# Patient Record
Sex: MALE | Employment: FULL TIME | URBAN - METROPOLITAN AREA
[De-identification: names, ages, dates, MRNs, and addresses within clinical notes are randomized per-mention and may not be internally consistent; named-entity substitution may affect disease eponyms.]

---

## 2022-12-08 ENCOUNTER — OFFICE VISIT (OUTPATIENT)
Dept: FAMILY MEDICINE CLINIC | Age: 62
End: 2022-12-08
Payer: COMMERCIAL

## 2022-12-08 VITALS
DIASTOLIC BLOOD PRESSURE: 78 MMHG | HEIGHT: 71 IN | HEART RATE: 102 BPM | SYSTOLIC BLOOD PRESSURE: 158 MMHG | BODY MASS INDEX: 39.83 KG/M2 | WEIGHT: 284.5 LBS | OXYGEN SATURATION: 94 % | RESPIRATION RATE: 16 BRPM

## 2022-12-08 DIAGNOSIS — E66.01 CLASS 3 SEVERE OBESITY DUE TO EXCESS CALORIES WITHOUT SERIOUS COMORBIDITY WITH BODY MASS INDEX (BMI) OF 40.0 TO 44.9 IN ADULT (HCC): ICD-10-CM

## 2022-12-08 DIAGNOSIS — R03.0 BORDERLINE HYPERTENSION: ICD-10-CM

## 2022-12-08 DIAGNOSIS — Z80.42 FAMILY HX OF PROSTATE CANCER: ICD-10-CM

## 2022-12-08 DIAGNOSIS — E78.5 HYPERLIPIDEMIA, UNSPECIFIED HYPERLIPIDEMIA TYPE: ICD-10-CM

## 2022-12-08 DIAGNOSIS — N50.89 SCROTAL MASS: Primary | ICD-10-CM

## 2022-12-08 LAB
A/G RATIO: 1.8 (ref 1.1–2.2)
ALBUMIN SERPL-MCNC: 4.2 G/DL (ref 3.4–5)
ALP BLD-CCNC: 82 U/L (ref 40–129)
ALT SERPL-CCNC: 23 U/L (ref 10–40)
ANION GAP SERPL CALCULATED.3IONS-SCNC: 14 MMOL/L (ref 3–16)
AST SERPL-CCNC: 19 U/L (ref 15–37)
BILIRUB SERPL-MCNC: 0.5 MG/DL (ref 0–1)
BUN BLDV-MCNC: 18 MG/DL (ref 7–20)
CALCIUM SERPL-MCNC: 9.2 MG/DL (ref 8.3–10.6)
CHLORIDE BLD-SCNC: 101 MMOL/L (ref 99–110)
CHOLESTEROL, TOTAL: 201 MG/DL (ref 0–199)
CO2: 24 MMOL/L (ref 21–32)
CREAT SERPL-MCNC: 0.8 MG/DL (ref 0.8–1.3)
GFR SERPL CREATININE-BSD FRML MDRD: >60 ML/MIN/{1.73_M2}
GLUCOSE BLD-MCNC: 100 MG/DL (ref 70–99)
HDLC SERPL-MCNC: 32 MG/DL (ref 40–60)
LDL CHOLESTEROL CALCULATED: 111 MG/DL
POTASSIUM SERPL-SCNC: 4.4 MMOL/L (ref 3.5–5.1)
PROSTATE SPECIFIC ANTIGEN: 1.61 NG/ML (ref 0–4)
SODIUM BLD-SCNC: 139 MMOL/L (ref 136–145)
TOTAL PROTEIN: 6.6 G/DL (ref 6.4–8.2)
TRIGL SERPL-MCNC: 292 MG/DL (ref 0–150)
VLDLC SERPL CALC-MCNC: 58 MG/DL

## 2022-12-08 PROCEDURE — 99204 OFFICE O/P NEW MOD 45 MIN: CPT | Performed by: FAMILY MEDICINE

## 2022-12-08 SDOH — ECONOMIC STABILITY: FOOD INSECURITY: WITHIN THE PAST 12 MONTHS, THE FOOD YOU BOUGHT JUST DIDN'T LAST AND YOU DIDN'T HAVE MONEY TO GET MORE.: NEVER TRUE

## 2022-12-08 SDOH — ECONOMIC STABILITY: FOOD INSECURITY: WITHIN THE PAST 12 MONTHS, YOU WORRIED THAT YOUR FOOD WOULD RUN OUT BEFORE YOU GOT MONEY TO BUY MORE.: NEVER TRUE

## 2022-12-08 ASSESSMENT — ENCOUNTER SYMPTOMS
EYE PAIN: 0
DIARRHEA: 0
SHORTNESS OF BREATH: 0
NAUSEA: 0
CHEST TIGHTNESS: 0
TROUBLE SWALLOWING: 0
WHEEZING: 0
ABDOMINAL PAIN: 0
VOMITING: 0
BLOOD IN STOOL: 0

## 2022-12-08 ASSESSMENT — PATIENT HEALTH QUESTIONNAIRE - PHQ9
SUM OF ALL RESPONSES TO PHQ9 QUESTIONS 1 & 2: 0
SUM OF ALL RESPONSES TO PHQ QUESTIONS 1-9: 0
2. FEELING DOWN, DEPRESSED OR HOPELESS: 0
SUM OF ALL RESPONSES TO PHQ QUESTIONS 1-9: 0
SUM OF ALL RESPONSES TO PHQ QUESTIONS 1-9: 0
1. LITTLE INTEREST OR PLEASURE IN DOING THINGS: 0
SUM OF ALL RESPONSES TO PHQ QUESTIONS 1-9: 0

## 2022-12-08 ASSESSMENT — SOCIAL DETERMINANTS OF HEALTH (SDOH): HOW HARD IS IT FOR YOU TO PAY FOR THE VERY BASICS LIKE FOOD, HOUSING, MEDICAL CARE, AND HEATING?: NOT HARD AT ALL

## 2022-12-08 NOTE — PROGRESS NOTES
12/8/2022    Rosenda Frienddmont Abshear    Chief Complaint   Patient presents with    Mass     Lump in scrotum. Left side. Noticed a year ago. Getting bigger. Baseball size moving down. Urinary Frequency     Little leaking and urgency to go. He believes it is from the mass. HPI  History was obtained from the patient. [facility] is a 63 y.o. male who presents today with history of noting scrotal nodule and then enlargement over the last 1 year. Not really painful to have a little bit of urinary difficulty at times depending on position. No history of trauma. Does have a history of noting a little bit of sensation as something giving way when he was lifting a heavy object little over a year ago. Does have intermittent trouble with constipation has increased his fiber which has helped. Patient has a past history of obesity, minimal coronary disease on previous studies, hyperlipidemia, and GE reflux. There is a family history of CA prostate and his father as a elderly man. Patient has had COVID disease in the past.  Does not really have any history of significant recent immunizations not sure he wants them. Patient takes only Nexium for GERD and occasional vitamin. Author Blanca REVIEW OF SYMPTOMS    Review of Systems   Constitutional:  Negative for activity change and fatigue. HENT:  Negative for congestion, hearing loss, mouth sores and trouble swallowing. Eyes:  Negative for pain and visual disturbance. Respiratory:  Negative for chest tightness, shortness of breath and wheezing. Cardiovascular:  Negative for chest pain and palpitations. Gastrointestinal:  Negative for abdominal pain, blood in stool, diarrhea, nausea and vomiting. Genitourinary:  Negative for dysuria, frequency and urgency. Musculoskeletal:  Negative for arthralgias, gait problem and neck stiffness. Skin:  Negative for rash. Allergic/Immunologic: Negative for environmental allergies.    Neurological:  Negative for dizziness, seizures, speech difficulty and weakness. Hematological:  Does not bruise/bleed easily. Psychiatric/Behavioral:  Negative for agitation, confusion, hallucinations, self-injury and suicidal ideas. The patient is not nervous/anxious. PAST MEDICAL HISTORY  Past Medical History:   Diagnosis Date    Abnormal EKG     Abnormal stress test     Chest pain     GERD (gastroesophageal reflux disease)     H/O cardiovascular stress test 2012-Abnormal study. Evidence of mild ischemia in RCA region. EF 70%. Global LV systolic function normal. Exercise capacity normal. No ECG changes. Exercise capacity 7.0 METS. H/O echocardiogram 2012    -PX systollic function normal. E => 55%. Doppler flow pattern suggestive of impaired LV relaxation. Left atrium is mildly dilated. History of complete ECG 10/8/2012    10/8/2012-Dr Etienne OV       FAMILY HISTORY  Family History   Problem Relation Age of Onset    Coronary Art Dis Mother     COPD Mother     Arrhythmia Mother         Atrial fib    Cancer Father      Labor Father     Kidney Disease Brother     Heart Attack Maternal Grandfather        SOCIAL HISTORY  Social History     Socioeconomic History    Marital status:    Tobacco Use    Smoking status: Never    Smokeless tobacco: Never   Vaping Use    Vaping Use: Never used   Substance and Sexual Activity    Alcohol use: No     Comment:        CAFFEINE: 2 luca daily    Drug use: Never    Sexual activity: Not Currently     Partners: Female     Social Determinants of Health     Financial Resource Strain: Low Risk     Difficulty of Paying Living Expenses: Not hard at all   Food Insecurity: No Food Insecurity    Worried About Running Out of Food in the Last Year: Never true    Ran Out of Food in the Last Year: Never true        SURGICAL HISTORY  No past surgical history on file.               CURRENT MEDICATIONS  Current Outpatient Medications   Medication Sig Dispense Refill    NEXIUM 40 MG capsule TAKE ONE CAPSULE BY MOUTH EVERY DAY AFTER DINNER 90 capsule 3    multivitamin (THERAGRAN) per tablet Take 1 tablet by mouth daily. Vitamin D (CHOLECALCIFEROL) 1000 UNITS CAPS capsule Take 1,000 Units by mouth daily. (Patient not taking: Reported on 12/8/2022)      SAW PALMETTO Take  by mouth daily. Verify strength  (Patient not taking: Reported on 12/8/2022)       No current facility-administered medications for this visit. ALLERGIES  No Known Allergies    PHYSICAL EXAM    BP (!) 158/78 (Site: Right Upper Arm, Position: Sitting, Cuff Size: Medium Adult)   Pulse (!) 102   Resp 16   Ht 5' 10.5\" (1.791 m)   Wt 284 lb 8 oz (129 kg)   SpO2 94%   BMI 40.24 kg/m²     Physical Exam  Vitals and nursing note reviewed. Constitutional:       General: He is not in acute distress. Appearance: He is well-developed. He is obese. He is not ill-appearing, toxic-appearing or diaphoretic. HENT:      Head: Normocephalic and atraumatic. Nose: No congestion. Mouth/Throat:      Mouth: Mucous membranes are moist.      Pharynx: Oropharynx is clear. Eyes:      Pupils: Pupils are equal, round, and reactive to light. Cardiovascular:      Rate and Rhythm: Normal rate and regular rhythm. Heart sounds: Normal heart sounds. No murmur heard. No gallop. Pulmonary:      Effort: Pulmonary effort is normal. No respiratory distress. Breath sounds: Normal breath sounds. No wheezing, rhonchi or rales. Abdominal:      General: There is no distension. Palpations: Abdomen is soft. Genitourinary:     Comments: Patient with massively enlarged scrotum with irregular mass inside probably bowel. Penis is retracted due to tension. No definite skin breakdown noted. No evidence of infection. Musculoskeletal:         General: No deformity. Normal range of motion. Cervical back: Normal range of motion and neck supple. No rigidity. Skin:     General: Skin is warm and dry. Coloration: Skin is not jaundiced. Findings: No bruising. Neurological:      General: No focal deficit present. Mental Status: He is alert and oriented to person, place, and time. Mental status is at baseline. Cranial Nerves: No cranial nerve deficit. Motor: No weakness. Psychiatric:         Mood and Affect: Mood normal.         Thought Content: Thought content normal.       ASSESSMENT & PLAN     Diagnosis Orders   1. Scrotal mass        2. Family hx of prostate cancer  PSA Screening      3. Hyperlipidemia, unspecified hyperlipidemia type  LIPID PANEL      4. Class 3 severe obesity due to excess calories without serious comorbidity with body mass index (BMI) of 40.0 to 44.9 in adult ([facility] [address]        5. Borderline hypertension  Comprehensive Metabolic Panel    CBC      Patient to check home blood pressures work on low-salt diet and weight loss. Check PSA, CMP, CBC and lipid panel. Patient encouraged to consider flu shot and COVID booster. So far does not have much in way of an immunizations. We will set up ultrasound of the scrotum to be sure that his bowel and scrotum then will refer for surgical repair as indicated. Patient will follow up for testing results. Return if symptoms worsen or fail to improve.          Electronically signed by Debbie Fontana MD on 12/8/2022

## 2022-12-15 ENCOUNTER — HOSPITAL ENCOUNTER (OUTPATIENT)
Dept: ULTRASOUND IMAGING | Age: 62
Discharge: HOME | End: 2022-12-15
Payer: COMMERCIAL

## 2022-12-15 DIAGNOSIS — N50.89 SCROTAL MASS: Primary | ICD-10-CM

## 2022-12-15 DIAGNOSIS — Z80.42 FAMILY HX OF PROSTATE CANCER: ICD-10-CM

## 2022-12-15 DIAGNOSIS — N50.89 SCROTAL MASS: ICD-10-CM

## 2022-12-15 PROCEDURE — 93975 VASCULAR STUDY: CPT

## 2022-12-15 PROCEDURE — 76870 US EXAM SCROTUM: CPT

## 2022-12-21 ENCOUNTER — HOSPITAL ENCOUNTER (OUTPATIENT)
Dept: CT IMAGING | Age: 62
Discharge: HOME | End: 2022-12-21
Payer: COMMERCIAL

## 2022-12-21 DIAGNOSIS — N50.89 SCROTAL MASS: ICD-10-CM

## 2022-12-21 PROCEDURE — 74177 CT ABD & PELVIS W/CONTRAST: CPT

## 2022-12-21 PROCEDURE — A4641 RADIOPHARM DX AGENT NOC: HCPCS | Performed by: FAMILY MEDICINE

## 2022-12-21 PROCEDURE — 6360000004 HC RX CONTRAST MEDICATION: Performed by: FAMILY MEDICINE

## 2022-12-21 RX ORDER — SODIUM CHLORIDE 0.9 % (FLUSH) 0.9 %
5-40 SYRINGE (ML) INJECTION PRN
Status: DISCONTINUED | OUTPATIENT
Start: 2022-12-21 | End: 2022-12-22 | Stop reason: HOSPADM

## 2022-12-21 RX ADMIN — IOPAMIDOL 75 ML: 755 INJECTION, SOLUTION INTRAVENOUS at 14:15

## 2022-12-21 RX ADMIN — BARIUM SULFATE 900 ML: 20 SUSPENSION ORAL at 13:20

## 2022-12-23 ENCOUNTER — TELEPHONE (OUTPATIENT)
Dept: FAMILY MEDICINE CLINIC | Age: 62
End: 2022-12-23

## 2022-12-23 NOTE — TELEPHONE ENCOUNTER
Patient had CT of abdomen pelvis with contrast yesterday. It did not show any suspicious areas of malignancy. Has some scrotal wall thickening, fatty liver, umbilical hernia containing fatty tissue, right inguinal hernia with fatty tissue, mild calcification of the posterior left bladder wall, and heterogeneous masses on both sides of the scrotal sac -left larger than right several centimeters on either side. Had 1 enlarged inguinal node felt to be probably reactive. Masses do not appear to be malignancy. Differential included hemorrhage, hematoma, testicular rupture etc.  Urologic consultation recommended. I discussed this with patient today on phone. Recommended that he call urologist (Dr. Justin Salazar) in the next few days to get appointment and consider treatment /surgery as they direct.

## 2023-01-20 ENCOUNTER — HOSPITAL ENCOUNTER (OUTPATIENT)
Dept: CT IMAGING | Age: 63
Discharge: HOME | End: 2023-01-20
Payer: COMMERCIAL

## 2023-01-20 ENCOUNTER — TELEPHONE (OUTPATIENT)
Dept: FAMILY MEDICINE CLINIC | Age: 63
End: 2023-01-20

## 2023-01-20 ENCOUNTER — TRANSCRIBE ORDERS (OUTPATIENT)
Dept: ADMINISTRATIVE | Age: 63
End: 2023-01-20

## 2023-01-20 ENCOUNTER — HOSPITAL ENCOUNTER (OUTPATIENT)
Age: 63
Discharge: HOME | End: 2023-01-20
Payer: COMMERCIAL

## 2023-01-20 DIAGNOSIS — D40.12 NEOPLASM OF UNCERTAIN BEHAVIOR OF LEFT TESTIS: ICD-10-CM

## 2023-01-20 DIAGNOSIS — D40.12 NEOPLASM OF UNCERTAIN BEHAVIOR OF LEFT TESTIS: Primary | ICD-10-CM

## 2023-01-20 LAB
ALBUMIN SERPL-MCNC: 4.3 GM/DL (ref 3.4–5)
ALP BLD-CCNC: 82 IU/L (ref 40–128)
ALT SERPL-CCNC: 22 U/L (ref 10–40)
ANION GAP SERPL CALCULATED.3IONS-SCNC: 12 MMOL/L (ref 4–16)
AST SERPL-CCNC: 17 IU/L (ref 15–37)
BASOPHILS ABSOLUTE: 0.1 K/CU MM
BASOPHILS RELATIVE PERCENT: 0.8 % (ref 0–1)
BILIRUB SERPL-MCNC: 0.6 MG/DL (ref 0–1)
BUN BLDV-MCNC: 17 MG/DL (ref 6–23)
CALCIUM SERPL-MCNC: 9.3 MG/DL (ref 8.3–10.6)
CHLORIDE BLD-SCNC: 101 MMOL/L (ref 99–110)
CO2: 23 MMOL/L (ref 21–32)
CREAT SERPL-MCNC: 0.9 MG/DL (ref 0.9–1.3)
DIFFERENTIAL TYPE: ABNORMAL
EOSINOPHILS ABSOLUTE: 0.2 K/CU MM
EOSINOPHILS RELATIVE PERCENT: 2.4 % (ref 0–3)
GFR SERPL CREATININE-BSD FRML MDRD: >60 ML/MIN/1.73M2
GLUCOSE BLD-MCNC: 170 MG/DL (ref 70–99)
HCG QUALITATIVE: NEGATIVE
HCT VFR BLD CALC: 47.5 % (ref 42–52)
HEMOGLOBIN: 15.1 GM/DL (ref 13.5–18)
IMMATURE NEUTROPHIL %: 0.6 % (ref 0–0.43)
LACTATE DEHYDROGENASE: 187 IU/L (ref 120–246)
LYMPHOCYTES ABSOLUTE: 1.6 K/CU MM
LYMPHOCYTES RELATIVE PERCENT: 18.2 % (ref 24–44)
MCH RBC QN AUTO: 30.9 PG (ref 27–31)
MCHC RBC AUTO-ENTMCNC: 31.8 % (ref 32–36)
MCV RBC AUTO: 97.3 FL (ref 78–100)
MONOCYTES ABSOLUTE: 0.8 K/CU MM
MONOCYTES RELATIVE PERCENT: 8.9 % (ref 0–4)
NUCLEATED RBC %: 0 %
PDW BLD-RTO: 12.7 % (ref 11.7–14.9)
PLATELET # BLD: 213 K/CU MM (ref 140–440)
PMV BLD AUTO: 12.4 FL (ref 7.5–11.1)
POTASSIUM SERPL-SCNC: 4.2 MMOL/L (ref 3.5–5.1)
RBC # BLD: 4.88 M/CU MM (ref 4.6–6.2)
SEGMENTED NEUTROPHILS ABSOLUTE COUNT: 6 K/CU MM
SEGMENTED NEUTROPHILS RELATIVE PERCENT: 69.1 % (ref 36–66)
SODIUM BLD-SCNC: 136 MMOL/L (ref 135–145)
TOTAL IMMATURE NEUTOROPHIL: 0.05 K/CU MM
TOTAL NUCLEATED RBC: 0 K/CU MM
TOTAL PROTEIN: 6.4 GM/DL (ref 6.4–8.2)
WBC # BLD: 8.6 K/CU MM (ref 4–10.5)

## 2023-01-20 PROCEDURE — 84703 CHORIONIC GONADOTROPIN ASSAY: CPT

## 2023-01-20 PROCEDURE — 83615 LACTATE (LD) (LDH) ENZYME: CPT

## 2023-01-20 PROCEDURE — 36415 COLL VENOUS BLD VENIPUNCTURE: CPT

## 2023-01-20 PROCEDURE — 82105 ALPHA-FETOPROTEIN SERUM: CPT

## 2023-01-20 PROCEDURE — 80053 COMPREHEN METABOLIC PANEL: CPT

## 2023-01-20 PROCEDURE — 71250 CT THORAX DX C-: CPT

## 2023-01-20 PROCEDURE — 85025 COMPLETE CBC W/AUTO DIFF WBC: CPT

## 2023-01-20 NOTE — TELEPHONE ENCOUNTER
Called to Let Patient know that his Forms he had dropped off were faxed to  's office, for them to complete, since he is the one doing the Surgery to take him off of work for Short Term Disability. Called to let Patient know the Fax was Successful. And I advised him to call their office to let them know.

## 2023-03-07 NOTE — PROGRESS NOTES
Patient Name:  Lenka Evans  Patient :  1960  Patient MRN:  [id number]     Primary Oncologist: Yelitza Thompson MD  Referring Provider: Saundra Valadez MD     Date of Service: 3/9/2023      Reason for Consult:  liposarcoma     Chief Complaint:    Chief Complaint   Patient presents with    New Patient      Patient Active Problem List:     Abnormal EKG     Abnormal stress test     Chest pain     Hyperlipidemia     ASHD (arteriosclerotic heart disease)     Family hx of prostate cancer     Class 3 severe obesity due to excess calories without serious comorbidity with body mass index (BMI) of 40.0 to 44.9 in adult      HPI:   Bony Lemos is a pleasant 65 yo male patient who was referred for  dedifferentiated liposarcoma s/p lef orchiectomy on 2023. He initially noted growing lump to left scrotum. 12/15/2022 US scrotum  Large hypoechoic 16 cm mass filling the scrotum. The testes could not be localized. Scrotal neoplasm is the primary consideration. No discrete bowel peristalsis to suggest a hernia. Urology referral and CT of the abdomen and pelvis with contrast should be considered. 2022 CT AP wo contrast:  1. There are large complex heterogeneous masses noted along both sides of the scrotal sac with the mass on the left measuring 17 x 17 x 11 cm and the mass on the right measuring approximately 7 x 6 x 5 cm. Differential diagnosis would include large hemorrhage/hematoma, testicular rupture, and less likely malignancy. There is only 1 enlarged left inguinal lymph node which is likely reactive. Urologic consultation is recommended. 2. Diffuse scrotal wall thickening which could be related to edema/infection. 3. Right inguinal hernia containing fat. 4. Calcification along the posterior left bladder wall which may represent an intraluminal bladder stone measuring 3 mm. 5. Fatty liver. 6. Small hiatal hernia.   7. Umbilical hernia containing fat.    2023 CT chest:  No acute process within the chest.   Multiple noncalcified pulmonary nodules, measuring up to approximately 5 mm, detailed above. Attention on follow-up is recommended. 2022 PSA 1.61.  2023 CMP and CBC grossly wnl with glucose 170. Hcg negative. AFP 2.     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 Clinical and pathological correlation is recommended. I ordered PET scan and referred to the sarcoma clinic at the [facility]. Due to increasing scrotal swelling likely seroma, I recommend to follow up with Dr Paul Andrea. He has one child. Father  of prostate cancer in his [de-identified]  At age 50 he has colonoscopy by Dr Kriste Skiff with complication. Past Medical History:   Diagnosis Date    Abnormal EKG     Abnormal stress test     Anxiety     Chest pain     GERD (gastroesophageal reflux disease)     H/O cardiovascular stress test 2012-Abnormal study. Evidence of mild ischemia in RCA region. EF 70%. Global LV systolic function normal. Exercise capacity normal. No ECG changes. Exercise capacity 7.0 METS. H/O echocardiogram 2012    -YT systollic function normal. E => 55%. Doppler flow pattern suggestive of impaired LV relaxation. Left atrium is mildly dilated.     History of complete ECG 10/08/2012    10/8/2012-Dr Etienne OV      Past Surgery History:    Left orchiectomy     Social History     Tobacco Use    Smoking status: Never    Smokeless tobacco: Never   Vaping Use    Vaping Use: Never used   Substance Use Topics    Alcohol use: No     Comment:        CAFFEINE: 2 luca daily    Drug use: Never     Family History   Problem Relation Age of Onset    Coronary Art Dis Mother     COPD Mother     Arrhythmia Mother         Atrial fib    Cancer Father      Labor Father     Kidney Disease Brother     Heart Attack Maternal Grandfather      No Known Allergies    Current Outpatient Medications on File Prior to Visit   Medication Sig Dispense Refill    NEXIUM 40 MG capsule TAKE ONE CAPSULE BY MOUTH EVERY DAY AFTER DINNER 90 capsule 3     No current facility-administered medications on file prior to visit. Review of Systems:    Constitutional:  No weight loss, No fever, No chills, No night sweats. Energy level good. Eyes:  No diplopia, No transient or permanent loss of vision, No scotomata. ENT / Mouth:  No epistaxis, No dysphagia, No hoarseness, No oral ulcers, No gingival bleeding. No sore throat, No postnasal drip, No nasal drip, No mouth pain, No sinus pain, No tinnitus, Normal hearing. Cardiovascular:  No chest pain, No palpitations, No syncope, No upper extremity edema, No lower extremity edema, No calf discomfort. Respiratory:  No cough. No hemoptysis, No pleurisy, No wheezing, No dyspnea. Gastrointestinal:  No abdominal pain, No abdominal cramping, No nausea, No vomiting, No constipation, No diarrhea, No hematochezia, No melena, No jaundice, No dyspepsia, No dysphagia. Urinary:  No dysuria, No hematuria, No urinary incontinence. Musculoskeletal:  No muscle pain, No swollen joints, No joint redness, No bone pain, No spine tenderness. Skin:  No rash, No nodules, No pruritus, No lesions. Neurologic:  No confusion, No seizures, No syncope, No tremor, No speech change, No headache, No hiccups, No abnormal gait, No sensory changes, No weakness. Psychiatric:  No depression, No anxiety, Concentration normal.  Endocrine:  No polyuria, No polydipsia, No hot flashes, No thyroid symptoms. Hematologic:  No epistaxis, No gingival bleeding, No petechiae, No ecchymosis. Lymphatic:  No lymphadenopathy, No lymphedema. Allergy / Immunologic:  No eczema, No frequent mucous infections, No frequent respiratory infections, No recurrent urticarial, No frequent skin infections.      Vital Signs: BP (!) 154/81 (Site: Right Upper Arm, Position: Sitting, Cuff Size: Medium Adult)   Pulse (!) 113   Temp 97.8 °F (36.6 °C) (Infrared)   Ht 5' 10\" (1.778 m)   Wt 278 lb (126.1 kg)   SpO2 96%   BMI 39.89 kg/m²      Physical Exam:  CONSTITUTIONAL: awake, alert, cooperative, no apparent distress   EYES: pupils equal, round and reactive to light, sclera clear and conjunctiva normal  ENT: Normocephalic, without obvious abnormality, atraumatic  NECK: supple, symmetrical, no jugular venous distension and no carotid bruits   HEMATOLOGIC/LYMPHATIC: no cervical, supraclavicular or axillary lymphadenopathy   LUNGS: no increased work of breathing and clear to auscultation   CARDIOVASCULAR: regular rate and rhythm, normal S1 and S2, no murmur noted  ABDOMEN: normal bowel sounds x 4, soft, non-distended, non-tender, no masses palpated, no hepatosplenomegaly   Scrotal swelling. MUSCULOSKELETAL: full range of motion noted, tone is normal  NEUROLOGIC: awake, alert, oriented to name, place and time. Motor skills grossly intact. SKIN: Normal skin color, texture, turgor and no jaundice.  appears intact   EXTREMITIES: no LE edema      Labs:  Hematology:  Lab Results   Component Value Date    WBC 8.6 01/20/2023    RBC 4.88 01/20/2023    HGB 15.1 01/20/2023    HCT 47.5 01/20/2023    MCV 97.3 01/20/2023    MCH 30.9 01/20/2023    MCHC 31.8 (L) 01/20/2023    RDW 12.7 01/20/2023     01/20/2023    MPV 12.4 (H) 01/20/2023    SEGSPCT 69.1 (H) 01/20/2023    EOSRELPCT 2.4 01/20/2023    BASOPCT 0.8 01/20/2023    LYMPHOPCT 18.2 (L) 01/20/2023    MONOPCT 8.9 (H) 01/20/2023    SEGSABS 6.0 01/20/2023    EOSABS 0.2 01/20/2023    BASOSABS 0.1 01/20/2023    LYMPHSABS 1.6 01/20/2023    MONOSABS 0.8 01/20/2023    DIFFTYPE AUTOMATED DIFFERENTIAL 01/20/2023     Chemistry:  Lab Results   Component Value Date     01/20/2023    K 4.2 01/20/2023     01/20/2023    CO2 23 01/20/2023    BUN 17 01/20/2023    CREATININE 0.9 01/20/2023 GLUCOSE 170 (H) 01/20/2023    CALCIUM 9.3 01/20/2023    PROT 6.4 01/20/2023    LABALBU 4.3 01/20/2023    BILITOT 0.6 01/20/2023    ALKPHOS 82 01/20/2023    AST 17 01/20/2023    ALT 22 01/20/2023    LABGLOM >60 01/20/2023    GFRAA >60 02/02/2015    AGRATIO 1.8 12/08/2022    GLOB 2.3 02/02/2015     Lab Results   Component Value Date     01/20/2023     Immunology:  Lab Results   Component Value Date    PROT 6.4 01/20/2023     Tumor Markers:  Lab Results   Component Value Date    PSA 1.61 12/08/2022     Observations:  PHQ-9 Total Score: 17 (3/9/2023  8:32 AM)  Thoughts that you would be better off dead, or of hurting yourself in some way: 0 (3/9/2023  8:32 AM)     Assessment & Plan:  1. He was diagnosed with dedifferentiated liposarcoma s/p lef orchiectomy on 1/30/2023. I ordered PET scan and referred to sarcoma clinic at the [facility]. 2. He has bilateral lung small nodules. PET ordered. 3. Colonoscopy at age 50. I recommend to f.u with Dr Anton Grey for scrotal swelling. RTC in 3 weeks or sooner. I have discussed the above stated plan with the patient and they verbalized understanding and agreed with the plan. Thank you for allowing us to participate in this patient's care.

## 2023-03-09 ENCOUNTER — INITIAL CONSULT (OUTPATIENT)
Dept: ONCOLOGY | Age: 63
End: 2023-03-09
Payer: COMMERCIAL

## 2023-03-09 ENCOUNTER — HOSPITAL ENCOUNTER (OUTPATIENT)
Dept: INFUSION THERAPY | Age: 63
Discharge: HOME | End: 2023-03-09
Payer: COMMERCIAL

## 2023-03-09 VITALS
DIASTOLIC BLOOD PRESSURE: 81 MMHG | OXYGEN SATURATION: 96 % | SYSTOLIC BLOOD PRESSURE: 154 MMHG | WEIGHT: 278 LBS | HEART RATE: 113 BPM | HEIGHT: 70 IN | BODY MASS INDEX: 39.8 KG/M2 | TEMPERATURE: 97.8 F

## 2023-03-09 DIAGNOSIS — C49.9 LIPOSARCOMA, DIFFERENTIATED (HCC): Primary | ICD-10-CM

## 2023-03-09 PROCEDURE — 99211 OFF/OP EST MAY X REQ PHY/QHP: CPT

## 2023-03-09 PROCEDURE — 99205 OFFICE O/P NEW HI 60 MIN: CPT | Performed by: INTERNAL MEDICINE

## 2023-03-09 ASSESSMENT — PATIENT HEALTH QUESTIONNAIRE - PHQ9
7. TROUBLE CONCENTRATING ON THINGS, SUCH AS READING THE NEWSPAPER OR WATCHING TELEVISION: 0
6. FEELING BAD ABOUT YOURSELF - OR THAT YOU ARE A FAILURE OR HAVE LET YOURSELF OR YOUR FAMILY DOWN: 2
SUM OF ALL RESPONSES TO PHQ QUESTIONS 1-9: 17
5. POOR APPETITE OR OVEREATING: 3
1. LITTLE INTEREST OR PLEASURE IN DOING THINGS: 3
10. IF YOU CHECKED OFF ANY PROBLEMS, HOW DIFFICULT HAVE THESE PROBLEMS MADE IT FOR YOU TO DO YOUR WORK, TAKE CARE OF THINGS AT HOME, OR GET ALONG WITH OTHER PEOPLE: 0
3. TROUBLE FALLING OR STAYING ASLEEP: 3
SUM OF ALL RESPONSES TO PHQ QUESTIONS 1-9: 17
SUM OF ALL RESPONSES TO PHQ QUESTIONS 1-9: 17
2. FEELING DOWN, DEPRESSED OR HOPELESS: 3
8. MOVING OR SPEAKING SO SLOWLY THAT OTHER PEOPLE COULD HAVE NOTICED. OR THE OPPOSITE, BEING SO FIGETY OR RESTLESS THAT YOU HAVE BEEN MOVING AROUND A LOT MORE THAN USUAL: 0
9. THOUGHTS THAT YOU WOULD BE BETTER OFF DEAD, OR OF HURTING YOURSELF: 0
4. FEELING TIRED OR HAVING LITTLE ENERGY: 3
SUM OF ALL RESPONSES TO PHQ9 QUESTIONS 1 & 2: 6
SUM OF ALL RESPONSES TO PHQ QUESTIONS 1-9: 17

## 2023-03-09 NOTE — PROGRESS NOTES
MA Rooming Questions  Patient: Matias Love  MRN: [id number]    Date: 3/9/2023      New patient      1. Do you have any new issues? yes - swelling in left testicle           5. Did the patient have a depression screening completed today?  Yes    PHQ-9 Total Score: 17 (3/9/2023  8:32 AM)  Thoughts that you would be better off dead, or of hurting yourself in some way: 0 (3/9/2023  8:32 AM)       PHQ-9 Given to (if applicable):               PHQ-9 Score (if applicable):                     [x] Positive     []  Negative              Does question #9 need addressed (if applicable)                     [] Yes    [x]  No               Aure Gerardo CMA

## 2023-03-16 ENCOUNTER — CLINICAL DOCUMENTATION (OUTPATIENT)
Dept: ONCOLOGY | Age: 63
End: 2023-03-16

## 2023-03-16 NOTE — PROGRESS NOTES
This nurse received a VM from [place] @ the [facility] sarcoma clinic. She reports that she called the patient to schedule him for his appointment. This nurse called [place] @ [phone]. Dr Vinita Hobbs called the patient @ [phone] to answer his questions and concerns.